# Patient Record
Sex: FEMALE | URBAN - METROPOLITAN AREA
[De-identification: names, ages, dates, MRNs, and addresses within clinical notes are randomized per-mention and may not be internally consistent; named-entity substitution may affect disease eponyms.]

---

## 2017-12-27 ENCOUNTER — TELEPHONE (OUTPATIENT)
Dept: PLASTIC SURGERY | Facility: CLINIC | Age: 22
End: 2017-12-27

## 2017-12-27 NOTE — TELEPHONE ENCOUNTER
Left VM with pt for possible earlier appointment for top surgery with Dr. Medina.  Current appointment with Dr. Salazar is June 2018.    Vince Andino, transgender care coordinator

## 2018-07-31 ENCOUNTER — HEALTH MAINTENANCE LETTER (OUTPATIENT)
Age: 23
End: 2018-07-31

## 2018-09-11 ENCOUNTER — HEALTH MAINTENANCE LETTER (OUTPATIENT)
Age: 23
End: 2018-09-11

## 2018-10-02 ENCOUNTER — HEALTH MAINTENANCE LETTER (OUTPATIENT)
Age: 23
End: 2018-10-02

## 2020-06-29 NOTE — PROGRESS NOTES
"Pre-Visit Planning     Future Appointments   Date Time Provider Department Center   7/1/2020  1:00 PM Cnocha Andrade MD [facility]     Arrival Time for this Appointment:  1:00 PM   Appointment Notes for this encounter:   large, raised, hardened spot below left collarbone     Questionnaires Reviewed/Assigned  No additional questionnaires are needed     {SCRIPTING FOR VOICEMAIL \"Hello, this is [your name] and I am calling to get you prepped for an upcoming visit, which will help speed up your actual visit. Please call us back at [phone]\" (Optional) :[id number]}  Patient preferred phone number: [phone]    Unable to reach. Left voicemail. Advised patient to call clinic back at [phone].    "

## 2020-07-01 ENCOUNTER — OFFICE VISIT (OUTPATIENT)
Dept: PEDIATRICS | Facility: CLINIC | Age: 25
End: 2020-07-01
Payer: COMMERCIAL

## 2020-07-01 VITALS
HEART RATE: 89 BPM | RESPIRATION RATE: 18 BRPM | DIASTOLIC BLOOD PRESSURE: 60 MMHG | TEMPERATURE: 98 F | OXYGEN SATURATION: 97 % | BODY MASS INDEX: 19.08 KG/M2 | SYSTOLIC BLOOD PRESSURE: 108 MMHG | HEIGHT: 70 IN | WEIGHT: 133.3 LBS

## 2020-07-01 DIAGNOSIS — L70.0 ACNE VULGARIS: Primary | ICD-10-CM

## 2020-07-01 PROCEDURE — 99203 OFFICE O/P NEW LOW 30 MIN: CPT | Mod: GC | Performed by: STUDENT IN AN ORGANIZED HEALTH CARE EDUCATION/TRAINING PROGRAM

## 2020-07-01 ASSESSMENT — MIFFLIN-ST. JEOR: SCORE: 1431.95

## 2020-07-01 NOTE — PROGRESS NOTES
"Subjective     Emilee Mendez is a 25 year old female who presents to clinic today for the following health issues:    HPI     Patient has a bump on left collar bone that has been bothering him for the past 2 months. When it first appeared it looked like a pimple, similar to lesions he has on his chest and back that he assumed were acne. It has lasted longer than he expected for acne. Not itchy, doesn't hurt, \"just there\". There is now a scab in the center of the lesion that he has picked at and it does not go away. When picked at, it doesn't bleed and there is no pus. When it's irritated it'll get more raised and irritated, but not necessarily getting larger. No similar lesions that have been present this long elsewhere.    Patient attempted to put acne cream (?tretinoin) on area when it first appeared, 1-2 times, but hasn't helped. Hasn't tried putting anything else on it. No recent fevers, chills.     No history of skin cancer. No history of other skin conditions except acne.    There is no problem list on file for this patient.    Past Surgical History:   Procedure Laterality Date     wisdom teeth Bilateral 2015    all 4 wisdom teeth pulled       Social History     Tobacco Use     Smoking status: Never Smoker     Smokeless tobacco: Never Used   Substance Use Topics     Alcohol use: Yes     Comment: Occasional      Family History   Problem Relation Age of Onset     Thyroid Disease Mother      Depression Mother      Mental Illness Brother          No Known Allergies    Reviewed and updated as needed this visit by Provider         Review of Systems   Constitutional, HEENT, cardiovascular, pulmonary, gi and gu systems are negative, except as otherwise noted.      Objective    /60 (BP Location: Right arm, Patient Position: Sitting, Cuff Size: Adult Regular)   Pulse 89   Temp 98  F (36.7  C) (Tympanic)   Resp 18   Ht 1.765 m (5' 9.5\")   Wt 60.5 kg (133 lb 4.8 oz)   SpO2 97%   BMI 19.40 kg/m    Body mass " index is 19.4 kg/m .  Physical Exam   GENERAL: healthy, alert and no distress  NECK: no adenopathy, no asymmetry, masses, or scars  RESP: breathing comfortably on room air  CV: regular rate and rhythm, normal S1 S2, no S3 or S4  ABDOMEN: soft, nontender  MS: no gross musculoskeletal defects noted, no edema  SKIN: mild to moderate comedonal acne on back and chest; single discrete closed comedone on left chest underneath collar bone w/ central scab w/o surrounding erythema or warmth    Diagnostic Test Results:  Labs reviewed in Epic        Assessment & Plan     1. Acne vulgaris  Lesion of concern on left chest consistent in appearance w/ lesions on upper back, likely a comedonal acne lesion w/ some post-inflammatory hyperpigmentation. No features concerning for malignancy or for overlying infection/cellulitis/abscess. Reviewed acne care regimens including topical and oral Rx - in his case would recommend BPO wash +/- topical antibiotic (would try this first before oral antibiotic or oral isotretinoin given he isn't currently using any products). He has some OTC products he would like to try at home. Encouraged him to contact clinic if he would like a prescription-strength topical wash or cream. Also advised to wash soon after getting sweaty, avoid scented products, use sun protection.    Return if symptoms worsen or fail to improve.    Concha Andrade MD  [facility]    I have seen and examined the patient, discussed with the resident and agree with the history, physical and plan as documented above.    Cheri Figueroa MD  Internal Medicine - Pediatrics

## 2020-07-01 NOTE — PATIENT INSTRUCTIONS
Pediatric Dermatology  [place] [id number] [place] [address], [facility] [address]  [place] [address]  Mild Acne  Recommendations for Care;    Wash face every night with a gentle cleanser.   o Brands of Gentle Cleanser; Neutrogena, Cetaphil, Purpose, Clinique bar, Basis and Vanicream cleansing bar.    Your doctor may recommend the use of an over the counter Benzoyl peroxide product (Neutrogena Clear Pore, Clean and Clear) and a gentle soap (Dove, Purpose, Cetaphil) or Salicylic Acid wash (Neutrogena Acne Wash). Additional recommended products: Neutrogena Oil-Free, Creamy Wash. Note- Aggressive scrubbing is NOT helpful.    A facial moisturizer should be applied. If you use makeup or sunscreen make sure that it is labeled  non-comedogenic  which means that it will not aggravate or cause acne. Try not to pick at your acne as this can delay healing and may lead to scarring.  o Brands; Vanicream, Cetaphil, Neutrogena, Clinique, CeraVe      Additional tips:    Washing your face with a gentle cleanser is recommended following an athletic activity, but do not over wash as this will make the skin more sensitive.    Try not to  pop  pimples, this can cause a delay in healing and can lead to scarring.     Make sure you are reading product labels.     Change your pillow case 1-2 times per week.     WHAT IS ACNE AND WHY DO I HAVE PIMPLES?  The medical term for  pimples  is acne. Most people get a least some acne. Many people also need acne medication. Your doctor will tell you if they think you are one of those people. The good news is that the medicine really works well when used properly.  Acne does not come from being dirty, but washing your face is part of taking care of your skin and will help keep your face clear. People with acne have glands that make more oil and are more easily plugged, causing the glands to swell and create blackheads and whiteheads. Hormones, bacteria and your inherited tendency to have acne all play a  role.

## 2020-12-27 ENCOUNTER — HEALTH MAINTENANCE LETTER (OUTPATIENT)
Age: 25
End: 2020-12-27

## 2021-10-09 ENCOUNTER — HEALTH MAINTENANCE LETTER (OUTPATIENT)
Age: 26
End: 2021-10-09

## 2022-01-29 ENCOUNTER — HEALTH MAINTENANCE LETTER (OUTPATIENT)
Age: 27
End: 2022-01-29

## 2022-09-11 ENCOUNTER — HEALTH MAINTENANCE LETTER (OUTPATIENT)
Age: 27
End: 2022-09-11

## 2023-05-06 ENCOUNTER — HEALTH MAINTENANCE LETTER (OUTPATIENT)
Age: 28
End: 2023-05-06